# Patient Record
Sex: FEMALE | URBAN - METROPOLITAN AREA
[De-identification: names, ages, dates, MRNs, and addresses within clinical notes are randomized per-mention and may not be internally consistent; named-entity substitution may affect disease eponyms.]

---

## 2017-01-09 ENCOUNTER — APPOINTMENT (OUTPATIENT)
Dept: URBAN - METROPOLITAN AREA CLINIC 290 | Age: 22
Setting detail: DERMATOLOGY
End: 2017-01-09

## 2017-01-09 DIAGNOSIS — L70.0 ACNE VULGARIS: ICD-10-CM

## 2017-01-09 PROCEDURE — OTHER COUNSELING: OTHER

## 2017-01-09 PROCEDURE — OTHER TREATMENT REGIMEN: OTHER

## 2017-01-09 PROCEDURE — 99213 OFFICE O/P EST LOW 20 MIN: CPT

## 2017-01-09 ASSESSMENT — LOCATION DETAILED DESCRIPTION DERM: LOCATION DETAILED: INFERIOR MID FOREHEAD

## 2017-01-09 ASSESSMENT — LOCATION ZONE DERM: LOCATION ZONE: FACE

## 2017-01-09 ASSESSMENT — LOCATION SIMPLE DESCRIPTION DERM: LOCATION SIMPLE: INFERIOR FOREHEAD

## 2017-01-09 NOTE — PROCEDURE: TREATMENT REGIMEN
Plan: Wash daily with CereVe facial cleanser and moisturize with CereVe PM\\nVeltin every night to upper half of face and every other night to bottom half, chin and around lips.
Detail Level: Simple

## 2021-06-28 ENCOUNTER — APPOINTMENT (OUTPATIENT)
Dept: URBAN - METROPOLITAN AREA CLINIC 231 | Age: 26
Setting detail: DERMATOLOGY
End: 2021-06-29

## 2021-06-28 DIAGNOSIS — Z41.9 ENCOUNTER FOR PROCEDURE FOR PURPOSES OTHER THAN REMEDYING HEALTH STATE, UNSPECIFIED: ICD-10-CM

## 2021-06-28 PROCEDURE — OTHER CHEMICAL PEEL: OTHER

## 2021-06-28 PROCEDURE — OTHER COSMETIC CONSULTATION: CHEMICAL PEELS: OTHER

## 2021-06-28 PROCEDURE — OTHER COSMETIC CONSULTATION: PRODUCTS: OTHER

## 2021-06-28 ASSESSMENT — LOCATION DETAILED DESCRIPTION DERM
LOCATION DETAILED: LEFT MEDIAL MALAR CHEEK
LOCATION DETAILED: LEFT INFERIOR CENTRAL MALAR CHEEK

## 2021-06-28 ASSESSMENT — LOCATION ZONE DERM: LOCATION ZONE: FACE

## 2021-06-28 ASSESSMENT — LOCATION SIMPLE DESCRIPTION DERM: LOCATION SIMPLE: LEFT CHEEK

## 2021-06-28 NOTE — PROCEDURE: CHEMICAL PEEL
Treatment Time (Optional): 5
Post-Care Instructions: I reviewed with the patient in detail post-care instructions. Patient should avoid sun exposure and wear sun protection.
Chemical Peel: Sal-Ex 20
Prep: The treated area was degreased with pre-peel cleanser, and alcohol
Number Of Layers: 4
Treatment Number: 1
Detail Level: Zone
Consent: Prior to the procedure, written consent was obtained and risks were reviewed, including but not limited to: redness, peeling, blistering, pigmentary change, scarring, infection, and pain.
Post Peel Care: After the procedure, a post-peel cream was applied to the treated areas. Sun protection and post-care instructions were reviewed with the patient.
Price (Use Numbers Only, No Special Characters Or $): 80.00

## 2022-10-11 ENCOUNTER — APPOINTMENT (OUTPATIENT)
Dept: URBAN - METROPOLITAN AREA CLINIC 231 | Age: 27
Setting detail: DERMATOLOGY
End: 2022-10-11

## 2022-10-11 DIAGNOSIS — Z41.9 ENCOUNTER FOR PROCEDURE FOR PURPOSES OTHER THAN REMEDYING HEALTH STATE, UNSPECIFIED: ICD-10-CM

## 2022-10-11 PROCEDURE — OTHER CHEMICAL PEEL: OTHER

## 2022-10-11 ASSESSMENT — LOCATION ZONE DERM
LOCATION ZONE: NECK
LOCATION ZONE: FACE

## 2022-10-11 ASSESSMENT — LOCATION DETAILED DESCRIPTION DERM
LOCATION DETAILED: LEFT INFERIOR MEDIAL FOREHEAD
LOCATION DETAILED: LEFT INFERIOR ANTERIOR NECK

## 2022-10-11 ASSESSMENT — LOCATION SIMPLE DESCRIPTION DERM
LOCATION SIMPLE: LEFT FOREHEAD
LOCATION SIMPLE: LEFT ANTERIOR NECK

## 2022-10-11 NOTE — PROCEDURE: CHEMICAL PEEL
Post Peel Care: After the procedure, a post-peel cream was applied to the treated areas. Sun protection and post-care instructions were reviewed with the patient.
Post-Care Instructions: I reviewed with the patient in detail post-care instructions. Patient should avoid sun exposure and wear sun protection.
Chemical Peel: Sal-Ex 20
Treatment Time (Optional): 5
Price (Use Numbers Only, No Special Characters Or $): 80
Detail Level: Zone
Treatment Number: 1
Consent: Prior to the procedure, written consent was obtained and risks were reviewed, including but not limited to: redness, peeling, blistering, pigmentary change, scarring, infection, and pain.
Price (Use Numbers Only, No Special Characters Or $): 20